# Patient Record
Sex: FEMALE | Race: WHITE | Employment: FULL TIME | URBAN - METROPOLITAN AREA
[De-identification: names, ages, dates, MRNs, and addresses within clinical notes are randomized per-mention and may not be internally consistent; named-entity substitution may affect disease eponyms.]

---

## 2018-04-11 LAB
AVERAGE GLUCOSE: NORMAL
HBA1C MFR BLD: 4.9 %

## 2019-12-20 LAB
AVERAGE GLUCOSE: NORMAL
HBA1C MFR BLD: 5 %

## 2023-06-20 ENCOUNTER — OFFICE VISIT (OUTPATIENT)
Dept: OBGYN CLINIC | Age: 43
End: 2023-06-20

## 2023-06-20 VITALS
DIASTOLIC BLOOD PRESSURE: 78 MMHG | BODY MASS INDEX: 35.39 KG/M2 | HEIGHT: 65 IN | SYSTOLIC BLOOD PRESSURE: 118 MMHG | WEIGHT: 212.4 LBS

## 2023-06-20 DIAGNOSIS — Z12.4 SCREENING FOR CERVICAL CANCER: ICD-10-CM

## 2023-06-20 DIAGNOSIS — Z76.89 ENCOUNTER TO ESTABLISH CARE: ICD-10-CM

## 2023-06-20 DIAGNOSIS — Z12.31 BREAST CANCER SCREENING BY MAMMOGRAM: ICD-10-CM

## 2023-06-20 DIAGNOSIS — Z13.89 SCREENING FOR GENITOURINARY CONDITION: ICD-10-CM

## 2023-06-20 DIAGNOSIS — Z01.419 WELL WOMAN EXAM: Primary | ICD-10-CM

## 2023-06-20 DIAGNOSIS — Z11.51 SCREENING FOR HPV (HUMAN PAPILLOMAVIRUS): ICD-10-CM

## 2023-06-20 LAB
BILIRUBIN, URINE, POC: NEGATIVE
BLOOD URINE, POC: NEGATIVE
GLUCOSE URINE, POC: NEGATIVE
KETONES, URINE, POC: NEGATIVE
LEUKOCYTE ESTERASE, URINE, POC: NEGATIVE
NITRITE, URINE, POC: NEGATIVE
PH, URINE, POC: 7 (ref 4.6–8)
PROTEIN,URINE, POC: NORMAL
SPECIFIC GRAVITY, URINE, POC: 1.02 (ref 1–1.03)
URINALYSIS CLARITY, POC: CLEAR
URINALYSIS COLOR, POC: YELLOW
UROBILINOGEN, POC: NORMAL

## 2023-06-20 RX ORDER — COPPER 313.4 MG/1
1 INTRAUTERINE DEVICE INTRAUTERINE ONCE
COMMUNITY

## 2023-06-20 NOTE — PROGRESS NOTES
Patient presents today for a routine gynecological examination. OB History          3    Para   2    Term   2            AB   1    Living   3         SAB   1    IAB        Ectopic        Molar        Multiple   1    Live Births   3              Last pap: about 2 years ago per patient    GYN History           Patient's last menstrual period was 2023 (exact date). Cycle Length 28 Lasting 5  negative dysmenorrhea; negative postcoital bleeding    Past Medical History:  History reviewed. No pertinent past medical history. Past Surgical History:  Past Surgical History:   Procedure Laterality Date     SECTION         Allergies:   No Known Allergies    Medication History:  Current Outpatient Medications   Medication Sig Dispense Refill    Paragard Intrauterine Copper IUD 1 each by IntraUTERine route once Inserted in 2017       No current facility-administered medications for this visit. Social History:  Social History     Socioeconomic History    Marital status:      Spouse name: Not on file    Number of children: Not on file    Years of education: Not on file    Highest education level: Not on file   Occupational History    Not on file   Tobacco Use    Smoking status: Not on file    Smokeless tobacco: Not on file   Substance and Sexual Activity    Alcohol use: Not on file    Drug use: Not on file    Sexual activity: Yes     Partners: Male     Birth control/protection: I.U.D. Comment: paragard-    Other Topics Concern    Not on file   Social History Narrative    Not on file     Social Determinants of Health     Financial Resource Strain: Not on file   Food Insecurity: Not on file   Transportation Needs: Not on file   Physical Activity: Not on file   Stress: Not on file   Social Connections: Not on file   Intimate Partner Violence: Not on file   Housing Stability: Not on file       Family History:  No family history on file.     Review of Systems - General ROS: negative

## 2023-06-26 LAB
CYTOLOGIST CVX/VAG CYTO: NORMAL
CYTOLOGY CVX/VAG DOC THIN PREP: NORMAL
HPV APTIMA: NEGATIVE
Lab: NORMAL
PATH REPORT.FINAL DX SPEC: NORMAL
STAT OF ADQ CVX/VAG CYTO-IMP: NORMAL

## 2023-07-05 ENCOUNTER — OFFICE VISIT (OUTPATIENT)
Dept: OBGYN CLINIC | Age: 43
End: 2023-07-05
Payer: COMMERCIAL

## 2023-07-05 VITALS
DIASTOLIC BLOOD PRESSURE: 70 MMHG | HEIGHT: 65 IN | SYSTOLIC BLOOD PRESSURE: 118 MMHG | BODY MASS INDEX: 35.09 KG/M2 | WEIGHT: 210.6 LBS

## 2023-07-05 DIAGNOSIS — R20.2 PARESTHESIA OF BOTH HANDS: ICD-10-CM

## 2023-07-05 DIAGNOSIS — Z86.69 HISTORY OF MIGRAINE WITH AURA: ICD-10-CM

## 2023-07-05 DIAGNOSIS — N92.6 IRREGULAR PERIODS: ICD-10-CM

## 2023-07-05 DIAGNOSIS — R41.89 BRAIN FOG: ICD-10-CM

## 2023-07-05 DIAGNOSIS — Z86.39 HISTORY OF THYROID NODULE: ICD-10-CM

## 2023-07-05 DIAGNOSIS — R53.83 OTHER FATIGUE: Primary | ICD-10-CM

## 2023-07-05 DIAGNOSIS — M25.542 ARTHRALGIA OF BOTH HANDS: ICD-10-CM

## 2023-07-05 DIAGNOSIS — M25.541 ARTHRALGIA OF BOTH HANDS: ICD-10-CM

## 2023-07-05 LAB
25(OH)D3 SERPL-MCNC: 10.7 NG/ML (ref 30–100)
BASOPHILS # BLD: 0 K/UL (ref 0–0.2)
BASOPHILS NFR BLD: 1 % (ref 0–2)
DIFFERENTIAL METHOD BLD: ABNORMAL
EOSINOPHIL # BLD: 0.1 K/UL (ref 0–0.8)
EOSINOPHIL NFR BLD: 1 % (ref 0.5–7.8)
ERYTHROCYTE [DISTWIDTH] IN BLOOD BY AUTOMATED COUNT: 12.9 % (ref 11.9–14.6)
ESTRADIOL SERPL-MCNC: 90.39 PG/ML
FSH SERPL-ACNC: 5.8 MIU/ML
HCT VFR BLD AUTO: 44 % (ref 35.8–46.3)
HGB BLD-MCNC: 14.7 G/DL (ref 11.7–15.4)
IMM GRANULOCYTES # BLD AUTO: 0 K/UL (ref 0–0.5)
IMM GRANULOCYTES NFR BLD AUTO: 0 % (ref 0–5)
LH SERPL-ACNC: 3 MIU/ML
LYMPHOCYTES # BLD: 2.5 K/UL (ref 0.5–4.6)
LYMPHOCYTES NFR BLD: 39 % (ref 13–44)
MCH RBC QN AUTO: 31.5 PG (ref 26.1–32.9)
MCHC RBC AUTO-ENTMCNC: 33.4 G/DL (ref 31.4–35)
MCV RBC AUTO: 94.4 FL (ref 82–102)
MONOCYTES # BLD: 0.7 K/UL (ref 0.1–1.3)
MONOCYTES NFR BLD: 11 % (ref 4–12)
NEUTS SEG # BLD: 3.1 K/UL (ref 1.7–8.2)
NEUTS SEG NFR BLD: 48 % (ref 43–78)
NRBC # BLD: 0 K/UL (ref 0–0.2)
PLATELET # BLD AUTO: 244 K/UL (ref 150–450)
PMV BLD AUTO: 9.2 FL (ref 9.4–12.3)
RBC # BLD AUTO: 4.66 M/UL (ref 4.05–5.2)
T4 FREE SERPL-MCNC: 1.1 NG/DL (ref 0.78–1.46)
TSH, 3RD GENERATION: 1.55 UIU/ML (ref 0.36–3.74)
WBC # BLD AUTO: 6.4 K/UL (ref 4.3–11.1)

## 2023-07-05 PROCEDURE — 99214 OFFICE O/P EST MOD 30 MIN: CPT | Performed by: NURSE PRACTITIONER

## 2023-07-05 NOTE — PROGRESS NOTES
in no distress. ASSESSMENT:  Encounter Diagnoses   Name Primary? Other fatigue Yes    Irregular periods     History of thyroid nodule     Arthralgia of both hands     History of migraine with aura     Brain fog     Paresthesia of both hands        PLAN:  All questions answered  Spent a great deal of time discussing above noted issues with pt  I am amenable to labs today per pt request  Does sound a bit more autoimmune in nature to me  Defer autoimmune workup/lyme disease workup to PCP  Will call pt with lab results and manage accordingly   Pt verbalized understanding     Orders Placed This Encounter   Procedures    TSH     Standing Status:   Future     Number of Occurrences:   1     Standing Expiration Date:   7/5/2024    T4, Free     Standing Status:   Future     Number of Occurrences:   1     Standing Expiration Date:   7/5/2024    Thyroid Peroxidase Antibody     Standing Status:   Future     Number of Occurrences:   1     Standing Expiration Date:   7/5/2024    Hemoglobin A1C     Standing Status:   Future     Number of Occurrences:   1     Standing Expiration Date:   0/5/1956    Follicle Stimulating Hormone     Standing Status:   Future     Number of Occurrences:   1     Standing Expiration Date:   7/5/2024    Luteinizing Hormone     Standing Status:   Future     Number of Occurrences:   1     Standing Expiration Date:   7/5/2024    CBC with Auto Differential     Standing Status:   Future     Number of Occurrences:   1     Standing Expiration Date:   7/5/2024    Estradiol     Standing Status:   Future     Number of Occurrences:   1     Standing Expiration Date:   7/5/2024    Testosterone, Free     Standing Status:   Future     Number of Occurrences:   1     Standing Expiration Date:   7/5/2024    Vitamin D 25 Hydroxy     Standing Status:   Future     Number of Occurrences:   1     Standing Expiration Date:   7/5/2024         Supervising physician is Dr. Moreno.   Greater than 50% of the 30 minute visit were

## 2023-07-06 LAB
EST. AVERAGE GLUCOSE BLD GHB EST-MCNC: 94 MG/DL
HBA1C MFR BLD: 4.9 % (ref 4.8–5.6)

## 2023-07-07 LAB — THYROPEROXIDASE AB SERPL-ACNC: 10 IU/ML (ref 0–34)

## 2023-07-08 LAB — TESTOST FREE SERPL-MCNC: 1.3 PG/ML (ref 0–4.2)

## 2023-07-10 ENCOUNTER — TELEPHONE (OUTPATIENT)
Dept: OBGYN CLINIC | Age: 43
End: 2023-07-10

## 2023-07-10 DIAGNOSIS — E55.9 VITAMIN D DEFICIENCY: Primary | ICD-10-CM

## 2023-07-10 RX ORDER — ERGOCALCIFEROL 1.25 MG/1
50000 CAPSULE ORAL WEEKLY
Qty: 8 CAPSULE | Refills: 1 | Status: SHIPPED | OUTPATIENT
Start: 2023-07-10

## 2023-07-10 NOTE — TELEPHONE ENCOUNTER
----- Message from DAVID Mirza CNP sent at 7/10/2023  7:40 AM EDT -----  Labs normal with exception of vit D def  50,000IU weekly x16 weeks then 2000IU daily for maintenance

## 2023-08-09 PROBLEM — Z76.89 ENCOUNTER TO ESTABLISH CARE WITH NEW DOCTOR: Status: ACTIVE | Noted: 2023-08-09

## 2023-08-09 ASSESSMENT — ENCOUNTER SYMPTOMS
COUGH: 0
NAUSEA: 0
ABDOMINAL PAIN: 0
SHORTNESS OF BREATH: 0
DIARRHEA: 0
VOMITING: 0

## 2023-08-09 NOTE — PROGRESS NOTES
Extraocular movements intact. Pupils: Pupils are equal, round, and reactive to light. Cardiovascular:      Rate and Rhythm: Normal rate and regular rhythm. Heart sounds: Normal heart sounds. No murmur heard. Pulmonary:      Effort: Pulmonary effort is normal. No respiratory distress. Breath sounds: Normal breath sounds. Abdominal:      General: Bowel sounds are normal.      Palpations: Abdomen is soft. Tenderness: There is no abdominal tenderness. There is no right CVA tenderness or left CVA tenderness. Musculoskeletal:         General: Normal range of motion. Cervical back: Normal range of motion. Skin:     General: Skin is warm. Findings: Rash present. Comments: Light oval-shaped pink macule on posterior neck   Neurological:      General: No focal deficit present. Mental Status: She is alert.    Psychiatric:         Mood and Affect: Mood normal.         Behavior: Behavior normal.         PERTINENT LABS AND IMAGING    Lab Results   Component Value Date    USX2IAO 1.550 07/05/2023     Lab Results   Component Value Date    T4FREE 1.1 07/05/2023     Hemoglobin A1C   Date Value Ref Range Status   07/05/2023 4.9 4.8 - 5.6 % Final     Lab Results   Component Value Date    WBC 6.4 08/10/2023    HGB 14.5 08/10/2023    HCT 43.8 08/10/2023    MCV 95.0 08/10/2023     08/10/2023     Lab Results   Component Value Date/Time    VITD25 10.7 07/05/2023 08:08 AM          Belkis Charles DO  8:27 PM  08/10/23

## 2023-08-10 ENCOUNTER — OFFICE VISIT (OUTPATIENT)
Dept: PRIMARY CARE CLINIC | Facility: CLINIC | Age: 43
End: 2023-08-10
Payer: COMMERCIAL

## 2023-08-10 VITALS
HEART RATE: 67 BPM | OXYGEN SATURATION: 98 % | DIASTOLIC BLOOD PRESSURE: 60 MMHG | BODY MASS INDEX: 35.79 KG/M2 | SYSTOLIC BLOOD PRESSURE: 110 MMHG | HEIGHT: 65 IN | WEIGHT: 214.8 LBS

## 2023-08-10 DIAGNOSIS — R53.83 FATIGUE, UNSPECIFIED TYPE: ICD-10-CM

## 2023-08-10 DIAGNOSIS — E55.9 VITAMIN D DEFICIENCY: ICD-10-CM

## 2023-08-10 DIAGNOSIS — R21 RASH: ICD-10-CM

## 2023-08-10 DIAGNOSIS — M25.50 ARTHRALGIA, UNSPECIFIED JOINT: ICD-10-CM

## 2023-08-10 DIAGNOSIS — R23.2 HOT FLASHES: ICD-10-CM

## 2023-08-10 DIAGNOSIS — Z76.89 ENCOUNTER TO ESTABLISH CARE WITH NEW DOCTOR: Primary | ICD-10-CM

## 2023-08-10 DIAGNOSIS — E04.2 MULTINODULAR GOITER: ICD-10-CM

## 2023-08-10 PROBLEM — J30.2 SEASONAL ALLERGIES: Status: ACTIVE | Noted: 2023-08-10

## 2023-08-10 PROBLEM — Q51.9 CONGENITAL UTERINE ANOMALY: Status: ACTIVE | Noted: 2023-08-10

## 2023-08-10 LAB
ALBUMIN SERPL-MCNC: 4.2 G/DL (ref 3.5–5)
ALBUMIN/GLOB SERPL: 1.5 (ref 0.4–1.6)
ALP SERPL-CCNC: 56 U/L (ref 50–136)
ALT SERPL-CCNC: 34 U/L (ref 12–65)
ANION GAP SERPL CALC-SCNC: 8 MMOL/L (ref 2–11)
AST SERPL-CCNC: 18 U/L (ref 15–37)
BASOPHILS # BLD: 0 K/UL (ref 0–0.2)
BASOPHILS NFR BLD: 1 % (ref 0–2)
BILIRUB SERPL-MCNC: 0.5 MG/DL (ref 0.2–1.1)
BUN SERPL-MCNC: 10 MG/DL (ref 6–23)
CALCIUM SERPL-MCNC: 9.1 MG/DL (ref 8.3–10.4)
CHLORIDE SERPL-SCNC: 113 MMOL/L (ref 101–110)
CO2 SERPL-SCNC: 24 MMOL/L (ref 21–32)
CREAT SERPL-MCNC: 0.9 MG/DL (ref 0.6–1)
CRP SERPL-MCNC: <0.3 MG/DL (ref 0–0.9)
DIFFERENTIAL METHOD BLD: NORMAL
EOSINOPHIL # BLD: 0.1 K/UL (ref 0–0.8)
EOSINOPHIL NFR BLD: 1 % (ref 0.5–7.8)
ERYTHROCYTE [DISTWIDTH] IN BLOOD BY AUTOMATED COUNT: 12.8 % (ref 11.9–14.6)
ERYTHROCYTE [SEDIMENTATION RATE] IN BLOOD: 2 MM/HR (ref 0–20)
GLOBULIN SER CALC-MCNC: 2.8 G/DL (ref 2.8–4.5)
GLUCOSE SERPL-MCNC: 93 MG/DL (ref 65–100)
HCT VFR BLD AUTO: 43.8 % (ref 35.8–46.3)
HGB BLD-MCNC: 14.5 G/DL (ref 11.7–15.4)
IMM GRANULOCYTES # BLD AUTO: 0 K/UL (ref 0–0.5)
IMM GRANULOCYTES NFR BLD AUTO: 0 % (ref 0–5)
LYMPHOCYTES # BLD: 1.9 K/UL (ref 0.5–4.6)
LYMPHOCYTES NFR BLD: 30 % (ref 13–44)
MCH RBC QN AUTO: 31.5 PG (ref 26.1–32.9)
MCHC RBC AUTO-ENTMCNC: 33.1 G/DL (ref 31.4–35)
MCV RBC AUTO: 95 FL (ref 82–102)
MONOCYTES # BLD: 0.5 K/UL (ref 0.1–1.3)
MONOCYTES NFR BLD: 8 % (ref 4–12)
NEUTS SEG # BLD: 3.8 K/UL (ref 1.7–8.2)
NEUTS SEG NFR BLD: 60 % (ref 43–78)
NRBC # BLD: 0 K/UL (ref 0–0.2)
PLATELET # BLD AUTO: 291 K/UL (ref 150–450)
PMV BLD AUTO: 9.4 FL (ref 9.4–12.3)
POTASSIUM SERPL-SCNC: 4.4 MMOL/L (ref 3.5–5.1)
PROT SERPL-MCNC: 7 G/DL (ref 6.3–8.2)
RBC # BLD AUTO: 4.61 M/UL (ref 4.05–5.2)
SODIUM SERPL-SCNC: 145 MMOL/L (ref 133–143)
URATE SERPL-MCNC: 5.7 MG/DL (ref 2.6–6)
WBC # BLD AUTO: 6.4 K/UL (ref 4.3–11.1)

## 2023-08-10 PROCEDURE — 99204 OFFICE O/P NEW MOD 45 MIN: CPT | Performed by: FAMILY MEDICINE

## 2023-08-10 NOTE — PATIENT INSTRUCTIONS
IT WAS GREAT TO SEE YOU TODAY! I WILL CALL YOU WITH THE RESULTS OF YOUR LABS. PLEASE CONTINUE TAKING YOUR VITAMIN D ONCE A WEEK AND WE WILL RECHECK YOUR VITAMIN D WHEN YOU RETURN. PLEASE WORK ON DIET - EAT MORE LEAN PROTEINS (CHICKEN, FISH, BEANS, TURKEY), FRUITS, VEGETABLES AND DRINK MORE WATER. EAT LESS RED MEAT, DAIRY PRODUCTS, STARCHY FOODS (POTATOES, RICE, PASTA, BREAD, TORTILLAS, CHIPS, COOKIES, CAKES), SWEETS AND DRINK LESS SODA, LESS ENERGY DRINKS, LESS JUICE AND SWEET TEA. TRY TO EAT THREE MEALS A DAY WITH SOME SORT OF PROTEIN AND TRY TO CUT BACK ON SNACKS (UNLESS IT IS HEALTHY - VEGGIES AND HUMMUS, ONE SERVING OF NUTS, ONE SERVING OF FRUIT, ETC). PAY ATTENTION TO SERVING SIZES ON THE PACKAGES SO YOU DO NOT EAT LARGER PORTIONS. Please try to do some form of aerobic exercise at least 3-4 times per week for about 20-30 minutes at a time. Aerobic exercise can include walking, hiking, jogging, swimming or using an elliptical machine. You can also do light weights or consider doing free exercises on your smart TV. [employer] has multiple free exercise videos that include yoga, kickboxing, pilates, aerobics, etc.    WE WILL CALL YOU ABOUT YOUR THYROID ULTRASOUND. I HAVE REFERRED YOU TO ENDOCRINOLOGY, THEY WILL CONTACT YOU WITH AN APPOINTMENT.     I WILL SEE YOU AGAIN IN 3 MONTHS BUT PLEASE CALL WITH CONCERNS [phone]

## 2023-08-11 LAB
CCP IGA+IGG SERPL IA-ACNC: 5 UNITS (ref 0–19)
RHEUMATOID FACT SER QL LA: NEGATIVE

## 2023-08-11 NOTE — ASSESSMENT & PLAN NOTE
Patient with scattered joint pains, family history of autoimmune diseases, at times has swelling. Will check autoimmune labs.

## 2023-08-11 NOTE — ASSESSMENT & PLAN NOTE
History of low Vitamin D recently found, just started 50,000 units weekly. Plan to continue this for three months and will recheck at next visit.

## 2023-08-11 NOTE — ASSESSMENT & PLAN NOTE
Patient with intermittent neck swelling, has had a history of Hashimoto's thyroiditis and thyroid nodules in the past.  Normal thyroid labs recently but patient still with discomfort. Will order a thyroid ultrasound and will refer to Endocrinology.

## 2023-08-11 NOTE — ASSESSMENT & PLAN NOTE
Patient with red macule on the back of her neck, has had this for some time with hot flashes, joint pains and fatigue. Concerned about possible Lyme disease due [place] in [facility] [address]. Will check Lyme disease titer.

## 2023-08-12 LAB — ANA SER QL: NEGATIVE

## 2023-08-14 LAB — LYME ANTIBODY: NEGATIVE

## 2023-08-25 ENCOUNTER — HOSPITAL ENCOUNTER (OUTPATIENT)
Dept: ULTRASOUND IMAGING | Age: 43
Discharge: HOME | End: 2023-08-25
Attending: FAMILY MEDICINE
Payer: COMMERCIAL

## 2023-08-25 DIAGNOSIS — E04.2 MULTINODULAR GOITER: ICD-10-CM

## 2023-08-25 PROCEDURE — 76536 US EXAM OF HEAD AND NECK: CPT

## 2023-12-04 DIAGNOSIS — E55.9 VITAMIN D DEFICIENCY: ICD-10-CM

## 2023-12-04 RX ORDER — ERGOCALCIFEROL 1.25 MG/1
CAPSULE ORAL
Qty: 8 CAPSULE | Refills: 1 | OUTPATIENT
Start: 2023-12-04

## 2024-08-12 ENCOUNTER — OFFICE VISIT (OUTPATIENT)
Dept: PRIMARY CARE CLINIC | Facility: CLINIC | Age: 44
End: 2024-08-12
Payer: COMMERCIAL

## 2024-08-12 VITALS
BODY MASS INDEX: 35.99 KG/M2 | SYSTOLIC BLOOD PRESSURE: 126 MMHG | TEMPERATURE: 98.2 F | DIASTOLIC BLOOD PRESSURE: 84 MMHG | OXYGEN SATURATION: 99 % | HEART RATE: 81 BPM | HEIGHT: 65 IN | WEIGHT: 216 LBS

## 2024-08-12 DIAGNOSIS — E66.9 OBESITY (BMI 35.0-39.9 WITHOUT COMORBIDITY): ICD-10-CM

## 2024-08-12 DIAGNOSIS — Z12.31 SCREENING MAMMOGRAM FOR BREAST CANCER: ICD-10-CM

## 2024-08-12 DIAGNOSIS — E55.9 VITAMIN D DEFICIENCY: ICD-10-CM

## 2024-08-12 DIAGNOSIS — E04.2 MULTINODULAR GOITER: ICD-10-CM

## 2024-08-12 DIAGNOSIS — Z00.00 ENCOUNTER FOR WELL ADULT EXAM WITHOUT ABNORMAL FINDINGS: ICD-10-CM

## 2024-08-12 DIAGNOSIS — Z00.00 ENCOUNTER FOR WELL ADULT EXAM WITHOUT ABNORMAL FINDINGS: Primary | ICD-10-CM

## 2024-08-12 LAB
25(OH)D3 SERPL-MCNC: 28.7 NG/ML (ref 30–100)
ALBUMIN SERPL-MCNC: 4.4 G/DL (ref 3.5–5)
ALBUMIN/GLOB SERPL: 1.5 (ref 1–1.9)
ALP SERPL-CCNC: 50 U/L (ref 35–104)
ALT SERPL-CCNC: 25 U/L (ref 12–65)
ANION GAP SERPL CALC-SCNC: 12 MMOL/L (ref 9–18)
AST SERPL-CCNC: 22 U/L (ref 15–37)
BASOPHILS # BLD: 0 K/UL (ref 0–0.2)
BASOPHILS NFR BLD: 1 % (ref 0–2)
BILIRUB SERPL-MCNC: 0.7 MG/DL (ref 0–1.2)
BUN SERPL-MCNC: 13 MG/DL (ref 6–23)
CALCIUM SERPL-MCNC: 9.4 MG/DL (ref 8.8–10.2)
CHLORIDE SERPL-SCNC: 104 MMOL/L (ref 98–107)
CHOLEST SERPL-MCNC: 246 MG/DL (ref 0–200)
CO2 SERPL-SCNC: 23 MMOL/L (ref 20–28)
CREAT SERPL-MCNC: 0.85 MG/DL (ref 0.6–1.1)
DIFFERENTIAL METHOD BLD: ABNORMAL
EOSINOPHIL # BLD: 0.1 K/UL (ref 0–0.8)
EOSINOPHIL NFR BLD: 1 % (ref 0.5–7.8)
ERYTHROCYTE [DISTWIDTH] IN BLOOD BY AUTOMATED COUNT: 12.4 % (ref 11.9–14.6)
EST. AVERAGE GLUCOSE BLD GHB EST-MCNC: 103 MG/DL
GLOBULIN SER CALC-MCNC: 2.9 G/DL (ref 2.3–3.5)
GLUCOSE SERPL-MCNC: 86 MG/DL (ref 70–99)
HBA1C MFR BLD: 5.2 % (ref 0–5.6)
HCT VFR BLD AUTO: 47.6 % (ref 35.8–46.3)
HDLC SERPL-MCNC: 56 MG/DL (ref 40–60)
HDLC SERPL: 4.4 (ref 0–5)
HGB BLD-MCNC: 15.9 G/DL (ref 11.7–15.4)
IMM GRANULOCYTES # BLD AUTO: 0 K/UL (ref 0–0.5)
IMM GRANULOCYTES NFR BLD AUTO: 0 % (ref 0–5)
LDLC SERPL CALC-MCNC: 161 MG/DL (ref 0–100)
LYMPHOCYTES # BLD: 1.8 K/UL (ref 0.5–4.6)
LYMPHOCYTES NFR BLD: 34 % (ref 13–44)
MCH RBC QN AUTO: 31.4 PG (ref 26.1–32.9)
MCHC RBC AUTO-ENTMCNC: 33.4 G/DL (ref 31.4–35)
MCV RBC AUTO: 93.9 FL (ref 82–102)
MONOCYTES # BLD: 0.5 K/UL (ref 0.1–1.3)
MONOCYTES NFR BLD: 10 % (ref 4–12)
NEUTS SEG # BLD: 3 K/UL (ref 1.7–8.2)
NEUTS SEG NFR BLD: 54 % (ref 43–78)
NRBC # BLD: 0 K/UL (ref 0–0.2)
PLATELET # BLD AUTO: 257 K/UL (ref 150–450)
PMV BLD AUTO: 9.1 FL (ref 9.4–12.3)
POTASSIUM SERPL-SCNC: 4.4 MMOL/L (ref 3.5–5.1)
PROT SERPL-MCNC: 7.3 G/DL (ref 6.3–8.2)
RBC # BLD AUTO: 5.07 M/UL (ref 4.05–5.2)
SODIUM SERPL-SCNC: 138 MMOL/L (ref 136–145)
TRIGL SERPL-MCNC: 147 MG/DL (ref 0–150)
TSH, 3RD GENERATION: 1.22 UIU/ML (ref 0.27–4.2)
VLDLC SERPL CALC-MCNC: 29 MG/DL (ref 6–23)
WBC # BLD AUTO: 5.5 K/UL (ref 4.3–11.1)

## 2024-08-12 PROCEDURE — 99396 PREV VISIT EST AGE 40-64: CPT | Performed by: FAMILY MEDICINE

## 2024-08-12 SDOH — ECONOMIC STABILITY: FOOD INSECURITY: WITHIN THE PAST 12 MONTHS, YOU WORRIED THAT YOUR FOOD WOULD RUN OUT BEFORE YOU GOT MONEY TO BUY MORE.: NEVER TRUE

## 2024-08-12 SDOH — ECONOMIC STABILITY: FOOD INSECURITY: WITHIN THE PAST 12 MONTHS, THE FOOD YOU BOUGHT JUST DIDN'T LAST AND YOU DIDN'T HAVE MONEY TO GET MORE.: NEVER TRUE

## 2024-08-12 SDOH — ECONOMIC STABILITY: INCOME INSECURITY: HOW HARD IS IT FOR YOU TO PAY FOR THE VERY BASICS LIKE FOOD, HOUSING, MEDICAL CARE, AND HEATING?: NOT HARD AT ALL

## 2024-08-12 ASSESSMENT — PATIENT HEALTH QUESTIONNAIRE - PHQ9
SUM OF ALL RESPONSES TO PHQ QUESTIONS 1-9: 0
SUM OF ALL RESPONSES TO PHQ QUESTIONS 1-9: 0
2. FEELING DOWN, DEPRESSED OR HOPELESS: NOT AT ALL
SUM OF ALL RESPONSES TO PHQ QUESTIONS 1-9: 0
SUM OF ALL RESPONSES TO PHQ9 QUESTIONS 1 & 2: 0
SUM OF ALL RESPONSES TO PHQ QUESTIONS 1-9: 0

## 2024-08-12 ASSESSMENT — ENCOUNTER SYMPTOMS
NAUSEA: 0
VOMITING: 0
DIARRHEA: 0
SHORTNESS OF BREATH: 0
COUGH: 0
ABDOMINAL PAIN: 0

## 2024-08-12 NOTE — ASSESSMENT & PLAN NOTE
Chronic, patient wants to start Semaglutide and says that her insurance will cover it with a certain BMI, which she meets.  Plans to start working on healthy diet and exercise, will recheck in 3 months.

## 2024-08-12 NOTE — PROGRESS NOTES
Well Adult Note  Name: Yolanda Casanova Today’s Date: 2024   MRN: [id number] Sex: Female   Age: 44 y.o. Ethnicity: Unavailable / Unknown   : 1980 Race: White (non-)      Yolanda Casanova is here for a well adult exam.       Subjective   History:    45 yo female presents today for her annual physical.  Busy at home with her kids this summer, waiting for them to return to school in two days.  States that she is interested in losing weight.  States that she is interested in trying to Semaglutide.  States that her insurance says they will cover it.  States that she knows she needs to work out and monitor her diet.  States that her  is doing well on it.  States that she went to a local clinic to look into it and told it was expensive.  States that her insurance sent her a letter saying that they will cover Semaglutide if her BMI is high enough.      Review of Systems   Constitutional:  Negative for fever.   HENT:  Negative for congestion.    Respiratory:  Negative for cough and shortness of breath.    Cardiovascular:  Negative for chest pain.   Gastrointestinal:  Negative for abdominal pain, diarrhea, nausea and vomiting.   Psychiatric/Behavioral:  Negative for dysphoric mood.        No Known Allergies  Prior to Visit Medications    Medication Sig Taking? Authorizing Provider   Semaglutide,0.25 or 0.5MG/DOS, 2 MG/1.5ML SOPN 0.25 mg injected subcutaneously once a week for four weeks, then increase to 0.5 mg injected subcutaneously once a week for four weeks Yes Amee Baltazar DO   vitamin D (ERGOCALCIFEROL) 1.25 MG (83133 UT) CAPS capsule Take 1 capsule by mouth once a week Yes Adenike Rausch, APRN - CNP   Paragard Intrauterine Copper IUD 1 each by IntraUTERine route once Inserted in 2017 Yes Provider, MD Mauricio     No past medical history on file.  Past Surgical History:   Procedure Laterality Date     SECTION       No family history on file.  Social

## 2024-09-11 PROBLEM — Z00.00 ENCOUNTER FOR WELL ADULT EXAM WITHOUT ABNORMAL FINDINGS: Status: RESOLVED | Noted: 2024-08-12 | Resolved: 2024-09-11

## 2025-03-27 ENCOUNTER — OFFICE VISIT (OUTPATIENT)
Dept: PRIMARY CARE CLINIC | Facility: CLINIC | Age: 45
End: 2025-03-27
Payer: COMMERCIAL

## 2025-03-27 VITALS
WEIGHT: 197 LBS | OXYGEN SATURATION: 98 % | RESPIRATION RATE: 16 BRPM | BODY MASS INDEX: 32.82 KG/M2 | DIASTOLIC BLOOD PRESSURE: 70 MMHG | HEIGHT: 65 IN | HEART RATE: 80 BPM | SYSTOLIC BLOOD PRESSURE: 122 MMHG

## 2025-03-27 DIAGNOSIS — R19.7 WATERY DIARRHEA: ICD-10-CM

## 2025-03-27 DIAGNOSIS — R11.2 NAUSEA, VOMITING, AND DIARRHEA: Primary | ICD-10-CM

## 2025-03-27 DIAGNOSIS — R19.7 NAUSEA, VOMITING, AND DIARRHEA: ICD-10-CM

## 2025-03-27 DIAGNOSIS — R14.2 BELCHING: ICD-10-CM

## 2025-03-27 DIAGNOSIS — L98.9 SKIN LESION OF NECK: ICD-10-CM

## 2025-03-27 DIAGNOSIS — R11.2 NAUSEA, VOMITING, AND DIARRHEA: ICD-10-CM

## 2025-03-27 DIAGNOSIS — R19.7 NAUSEA, VOMITING, AND DIARRHEA: Primary | ICD-10-CM

## 2025-03-27 LAB
ALBUMIN SERPL-MCNC: 3.9 G/DL (ref 3.5–5)
ALBUMIN/GLOB SERPL: 1.5 (ref 1–1.9)
ALP SERPL-CCNC: 55 U/L (ref 35–104)
ALT SERPL-CCNC: 24 U/L (ref 8–45)
ANION GAP SERPL CALC-SCNC: 12 MMOL/L (ref 7–16)
AST SERPL-CCNC: 27 U/L (ref 15–37)
BASOPHILS # BLD: 0.03 K/UL (ref 0–0.2)
BASOPHILS NFR BLD: 0.3 % (ref 0–2)
BILIRUB SERPL-MCNC: 0.3 MG/DL (ref 0–1.2)
BUN SERPL-MCNC: 14 MG/DL (ref 6–23)
CALCIUM SERPL-MCNC: 9.6 MG/DL (ref 8.8–10.2)
CHLORIDE SERPL-SCNC: 100 MMOL/L (ref 98–107)
CO2 SERPL-SCNC: 26 MMOL/L (ref 20–29)
CREAT SERPL-MCNC: 0.96 MG/DL (ref 0.6–1.1)
DIFFERENTIAL METHOD BLD: NORMAL
EOSINOPHIL # BLD: 0.7 K/UL (ref 0–0.8)
EOSINOPHIL NFR BLD: 7.2 % (ref 0.5–7.8)
ERYTHROCYTE [DISTWIDTH] IN BLOOD BY AUTOMATED COUNT: 12.9 % (ref 11.9–14.6)
GLOBULIN SER CALC-MCNC: 2.7 G/DL (ref 2.3–3.5)
GLUCOSE SERPL-MCNC: 89 MG/DL (ref 70–99)
HCT VFR BLD AUTO: 45.1 % (ref 35.8–46.3)
HGB BLD-MCNC: 15.4 G/DL (ref 11.7–15.4)
IMM GRANULOCYTES # BLD AUTO: 0.08 K/UL (ref 0–0.5)
IMM GRANULOCYTES NFR BLD AUTO: 0.8 % (ref 0–5)
LYMPHOCYTES # BLD: 3.35 K/UL (ref 0.5–4.6)
LYMPHOCYTES NFR BLD: 34.5 % (ref 13–44)
MAGNESIUM SERPL-MCNC: 2.1 MG/DL (ref 1.8–2.4)
MCH RBC QN AUTO: 30.5 PG (ref 26.1–32.9)
MCHC RBC AUTO-ENTMCNC: 34.1 G/DL (ref 31.4–35)
MCV RBC AUTO: 89.3 FL (ref 82–102)
MONOCYTES # BLD: 0.84 K/UL (ref 0.1–1.3)
MONOCYTES NFR BLD: 8.7 % (ref 4–12)
NEUTS SEG # BLD: 4.71 K/UL (ref 1.7–8.2)
NEUTS SEG NFR BLD: 48.5 % (ref 43–78)
NRBC # BLD: 0 K/UL (ref 0–0.2)
PLATELET # BLD AUTO: 318 K/UL (ref 150–450)
PMV BLD AUTO: 9.4 FL (ref 9.4–12.3)
POTASSIUM SERPL-SCNC: 3.3 MMOL/L (ref 3.5–5.1)
PROT SERPL-MCNC: 6.6 G/DL (ref 6.3–8.2)
RBC # BLD AUTO: 5.05 M/UL (ref 4.05–5.2)
SODIUM SERPL-SCNC: 137 MMOL/L (ref 136–145)
WBC # BLD AUTO: 9.7 K/UL (ref 4.3–11.1)

## 2025-03-27 PROCEDURE — 99215 OFFICE O/P EST HI 40 MIN: CPT

## 2025-03-27 RX ORDER — ONDANSETRON 4 MG/1
4 TABLET, FILM COATED ORAL EVERY 6 HOURS PRN
COMMUNITY
Start: 2025-03-21

## 2025-03-27 RX ORDER — DICYCLOMINE HCL 20 MG
20 TABLET ORAL 2 TIMES DAILY
COMMUNITY
Start: 2025-03-21

## 2025-03-27 RX ORDER — ONDANSETRON 4 MG/1
4 TABLET, ORALLY DISINTEGRATING ORAL 3 TIMES DAILY PRN
Qty: 21 TABLET | Refills: 0 | Status: SHIPPED | OUTPATIENT
Start: 2025-03-27

## 2025-03-27 SDOH — ECONOMIC STABILITY: FOOD INSECURITY: WITHIN THE PAST 12 MONTHS, YOU WORRIED THAT YOUR FOOD WOULD RUN OUT BEFORE YOU GOT MONEY TO BUY MORE.: NEVER TRUE

## 2025-03-27 SDOH — ECONOMIC STABILITY: FOOD INSECURITY: WITHIN THE PAST 12 MONTHS, THE FOOD YOU BOUGHT JUST DIDN'T LAST AND YOU DIDN'T HAVE MONEY TO GET MORE.: NEVER TRUE

## 2025-03-27 ASSESSMENT — PATIENT HEALTH QUESTIONNAIRE - PHQ9
SUM OF ALL RESPONSES TO PHQ QUESTIONS 1-9: 0
2. FEELING DOWN, DEPRESSED OR HOPELESS: NOT AT ALL
1. LITTLE INTEREST OR PLEASURE IN DOING THINGS: NOT AT ALL
SUM OF ALL RESPONSES TO PHQ QUESTIONS 1-9: 0

## 2025-03-27 ASSESSMENT — ENCOUNTER SYMPTOMS
ABDOMINAL PAIN: 1
SHORTNESS OF BREATH: 0
BLOOD IN STOOL: 0
EYES NEGATIVE: 1
COUGH: 0
NAUSEA: 1
DIARRHEA: 1
VOMITING: 1
CHEST TIGHTNESS: 0
ALLERGIC/IMMUNOLOGIC NEGATIVE: 1

## 2025-03-27 NOTE — PROGRESS NOTES
[facility]  Gracie \"Amy\" LISETTE Shoemaker  [address], [place] [address]  [phone]         ASSESSMENT AND PLAN    Problem List Items Addressed This Visit       Watery diarrhea    Relevant Medications    ondansetron (ZOFRAN-ODT) 4 MG disintegrating tablet    Other Relevant Orders    Ova and Parasite Examination    Giardia antigen    Culture, Stool    C. difficile toxin Molecular    Shiga-Like Toxin Antigen, EIA    Culture, Campylobacter, Stool    Culture, Vibrio, Stool    Culture, Yersinia, Stool    Comprehensive Metabolic Panel w/ Reflex to MG    CBC with Auto Differential    Magnesium    Nausea, vomiting, and diarrhea - Primary     Acute, unmanaged  - has maalox & bentyl from ER. Requests odt zofran instead of tablet prescribed by ER, per patient request  - CT abd/pelvis in ED unremarkable for anything other than R luteal ovarian cyst (started period yesterday) & R sided kidney stone  - travels a lot for work, history of giardia infection  - watery diarrhea  - stool studies ordered  - cmp & mg ordered due to hydrating mainly with water recently. Advised to add in electrolyte replacement.   - cbc w/ diff due to elevated hgb in hospital. Likely due to hypovolemia, but will recheck just to be sure now that better hydrated.   - return if symptoms acutely worsen or continue to persist w/o improvement past treatment  - f/u routine  or sooner for acute care needs           Relevant Medications    ondansetron (ZOFRAN-ODT) 4 MG disintegrating tablet    Other Relevant Orders    Ova and Parasite Examination    Giardia antigen    Culture, Stool    C. difficile toxin Molecular    Shiga-Like Toxin Antigen, EIA    Culture, Campylobacter, Stool    Culture, Vibrio, Stool    Culture, Yersinia, Stool    Comprehensive Metabolic Panel w/ Reflex to MG    CBC with Auto Differential    Magnesium    Belching    Relevant Medications    ondansetron (ZOFRAN-ODT) 4 MG disintegrating tablet

## 2025-03-27 NOTE — ASSESSMENT & PLAN NOTE
New problem, uncertain prognosis  - nonpruritic, nonpainful skin lesion on back of neck noticed by  Sunday (I.e. 6 days after GI symptom onset). Denies h/o such lesion or birth remedios in that area  - advised to return, if lesion persists past GI symptoms

## 2025-03-27 NOTE — ASSESSMENT & PLAN NOTE
Acute, unmanaged  - has maalox & bentyl from ER. Requests odt zofran instead of tablet prescribed by ER, per patient request  - CT abd/pelvis in ED unremarkable for anything other than R luteal ovarian cyst (started period yesterday) & R sided kidney stone  - travels a lot for work, history of giardia infection  - watery diarrhea  - stool studies ordered  - cmp & mg ordered due to hydrating mainly with water recently. Advised to add in electrolyte replacement.   - cbc w/ diff due to elevated hgb in hospital. Likely due to hypovolemia, but will recheck just to be sure now that better hydrated.   - return if symptoms acutely worsen or continue to persist w/o improvement past treatment  - f/u routine  or sooner for acute care needs

## 2025-03-27 NOTE — PATIENT INSTRUCTIONS
IT WAS GREAT TO MEET YOU TODAY!    WE WILL CONTACT YOU WITH THE RESULTS OF YOUR LABS.    PLEASE TAKE ALL MEDICATION AS DISCUSSED.   - ZOFRAN AS NEEDED FOR NAUSEA  - CONSIDER TRYING ANGIE-RESQ FROM PHARMACY (HAS BOVINE COLOSTRUM IN IT)  - PROBIOTIC  - DRINK ELECTROLYTE RICH BEVERAGES, AS WELL AS WATER  - CONTINUE OTHER MEDS AS DIRECTED    DRINK LOTS OF WATER. WEAR YOUR SEATBELT. FOCUS ON FRESH FRUITS AND VEGGIES DAILY, AND LEAN MEATS LIKE CHICKEN OR FISH. AVOID FRIED, FATTY FOODS, BREAD, PASTA, SALT AND \"ADDED\" SUGAR, ESPECIALLY JUICES AND SODA. AVOID TOBACCO AND EXCESSIVE ALCOHOL. EXERCISE AT LEAST 5 TIMES A WEEK FOR AT LEAST 30 MINUTES AT A TIME. KEEP A CONSISTENT SLEEP SCHEDULE.     RETURN IF SYMPTOMS WORSEN OR DO NOT IMPROVE WITH TREATMENT.  ESPECIALLY LET US KNOW, IF YOUR SKIN LESION ON BACK OF NECK DOES NOT RESOLVE BY THE TIME YOU ARE FEELING BETTER FROM GI ILLNESS.    WE WILL SEE YOU AGAIN AT YOUR NEXT APPOINTMENT WITH US BUT PLEASE SEND [place] [facility] [place] MESSAGE OR CALL WITH CONCERNS [phone]

## 2025-03-30 ENCOUNTER — RESULTS FOLLOW-UP (OUTPATIENT)
Dept: PRIMARY CARE CLINIC | Facility: CLINIC | Age: 45
End: 2025-03-30

## 2025-04-02 DIAGNOSIS — R11.2 NAUSEA, VOMITING, AND DIARRHEA: ICD-10-CM

## 2025-04-02 DIAGNOSIS — R14.2 BELCHING: ICD-10-CM

## 2025-04-02 DIAGNOSIS — R19.7 WATERY DIARRHEA: ICD-10-CM

## 2025-04-02 DIAGNOSIS — R19.7 NAUSEA, VOMITING, AND DIARRHEA: ICD-10-CM

## 2025-04-04 LAB
BACTERIA SPEC CULT: NORMAL
SERVICE CMNT-IMP: NORMAL

## 2025-04-07 LAB
BACTERIA SPEC CULT: NORMAL
G LAMBLIA AG STL QL IA: NEGATIVE
SPECIMEN SOURCE: NORMAL
SPECIMEN SOURCE: NORMAL
VIBRIO STL CULT: NORMAL

## 2025-04-08 LAB
BACTERIA SPEC CULT: NORMAL
CAMPYLOBACTER STL CULT: NORMAL
E COLI SXT STL QL IA: NORMAL
O+P SPEC MICRO: NORMAL
O+P STL CONC: NORMAL
SPECIMEN SOURCE: NORMAL

## 2025-04-10 LAB
SPECIMEN SOURCE: NORMAL
YERSINIA RESULT 1: NORMAL
YERSINIA SPEC CULT: NORMAL

## 2025-06-13 DIAGNOSIS — Z12.31 SCREENING MAMMOGRAM FOR BREAST CANCER: ICD-10-CM

## 2025-07-28 NOTE — PROGRESS NOTES
tract symptoms. No neurological complaints.    Objective:   /66   Ht 1.651 m (5' 5\")   Wt 89.6 kg (197 lb 9.6 oz)   LMP 07/09/2025 (Exact Date)   BMI 32.88 kg/m²   The patient appears well, alert, oriented x 3, in no distress.  ENT normal.  Neck supple. No adenopathy or thyromegaly.   Lungs:  clear, good air entry, no wheezes, rhonchi or rales.   Heart:  S1 and S2 normal, no murmurs, regular rate and rhythm.  Abdomen:  soft without tenderness, guarding, mass or organomegaly.   Extremities show no edema, normal peripheral pulses.   Neurological is normal, no focal findings.    BREAST EXAM: breasts appear normal, no suspicious masses, no skin or nipple changes or axillary nodes, symmetric fibrous changes bilaterally    PELVIC EXAM: VULVA: normal appearing vulva with no masses, tenderness or lesions, VAGINA: normal appearing vagina with normal color and discharge, no lesions, CERVIX: normal appearing cervix without discharge or lesions, IUD strings visible UTERUS: uterus is normal size, shape, consistency and nontender, ADNEXA: normal adnexa in size, nontender and no masses    Assessment/Plan:     1. Screening for genitourinary condition    - AMB POC URINALYSIS DIP STICK MANUAL W/O MICRO    2. Well woman exam    - AMB POC URINALYSIS DIP STICK MANUAL W/O MICRO    3. Encounter for screening mammogram for malignant neoplasm of breast    - RENATA YVETTE DIGITAL SCREEN BILATERAL; Future    4. Dense breast tissue    - MRI BREAST BILATERAL W WO CONTRAST; Future    5. Screen for STD (sexually transmitted disease)    - PAP IG, CT-NG-TV, Aptima HPV and rfx 16/18,45 (199315); Future  - PAP IG, CT-NG-TV, Aptima HPV and rfx 16/18,45 (199315)    6. Screening for human papillomavirus (HPV)    - PAP IG, CT-NG-TV, Aptima HPV and rfx 16/18,45 (199315); Future  - PAP IG, CT-NG-TV, Aptima HPV and rfx 16/18,45 (199315)    7. Screening for malignant neoplasm of cervix    - PAP IG, CT-NG-TV, Aptima HPV and rfx 16/18,45 (199315);

## 2025-07-30 ENCOUNTER — OFFICE VISIT (OUTPATIENT)
Dept: OBGYN CLINIC | Age: 45
End: 2025-07-30
Payer: COMMERCIAL

## 2025-07-30 VITALS
WEIGHT: 197.6 LBS | DIASTOLIC BLOOD PRESSURE: 66 MMHG | HEIGHT: 65 IN | BODY MASS INDEX: 32.92 KG/M2 | SYSTOLIC BLOOD PRESSURE: 110 MMHG

## 2025-07-30 DIAGNOSIS — R92.30 DENSE BREAST TISSUE: ICD-10-CM

## 2025-07-30 DIAGNOSIS — Z11.3 SCREEN FOR STD (SEXUALLY TRANSMITTED DISEASE): ICD-10-CM

## 2025-07-30 DIAGNOSIS — Z01.419 WELL WOMAN EXAM: Primary | ICD-10-CM

## 2025-07-30 DIAGNOSIS — Z12.4 SCREENING FOR MALIGNANT NEOPLASM OF CERVIX: ICD-10-CM

## 2025-07-30 DIAGNOSIS — Z13.89 SCREENING FOR GENITOURINARY CONDITION: ICD-10-CM

## 2025-07-30 DIAGNOSIS — Z12.31 ENCOUNTER FOR SCREENING MAMMOGRAM FOR MALIGNANT NEOPLASM OF BREAST: ICD-10-CM

## 2025-07-30 DIAGNOSIS — Z11.51 SCREENING FOR HUMAN PAPILLOMAVIRUS (HPV): ICD-10-CM

## 2025-07-30 LAB
BILIRUBIN, URINE, POC: NEGATIVE
BLOOD URINE, POC: NEGATIVE
GLUCOSE URINE, POC: NEGATIVE
KETONES, URINE, POC: NEGATIVE
LEUKOCYTE ESTERASE, URINE, POC: NEGATIVE
NITRITE, URINE, POC: NEGATIVE
PH, URINE, POC: 6 (ref 4.6–8)
PROTEIN,URINE, POC: NEGATIVE
SPECIFIC GRAVITY, URINE, POC: 1.02 (ref 1–1.03)
URINALYSIS CLARITY, POC: CLEAR
URINALYSIS COLOR, POC: NORMAL
UROBILINOGEN, POC: NORMAL MG/DL

## 2025-07-30 PROCEDURE — 81002 URINALYSIS NONAUTO W/O SCOPE: CPT | Performed by: NURSE PRACTITIONER

## 2025-07-30 PROCEDURE — 99396 PREV VISIT EST AGE 40-64: CPT | Performed by: NURSE PRACTITIONER

## 2025-07-30 RX ORDER — CETIRIZINE HYDROCHLORIDE 10 MG/1
10 TABLET ORAL DAILY
COMMUNITY

## 2025-08-13 LAB
C TRACH RRNA CVX QL NAA+PROBE: NEGATIVE
COLLECTION METHOD: NORMAL
CYTOLOGIST CVX/VAG CYTO: NORMAL
CYTOLOGY CVX/VAG DOC THIN PREP: NORMAL
DATE OF LMP: NORMAL
HPV APTIMA: NEGATIVE
HPV GENOTYPE REFLEX: NORMAL
Lab: NORMAL
N GONORRHOEA RRNA CVX QL NAA+PROBE: NEGATIVE
PAP SOURCE: NORMAL
PATH REPORT.FINAL DX SPEC: NORMAL
PREV TREATMENT: NORMAL
STAT OF ADQ CVX/VAG CYTO-IMP: NORMAL
T VAGINALIS RRNA SPEC QL NAA+PROBE: NEGATIVE